# Patient Record
(demographics unavailable — no encounter records)

---

## 2025-05-08 NOTE — DISCUSSION/SUMMARY
[FreeTextEntry1] : Drinks up to 90 oz of water per day.  Will cut back and limit to 72 oz. Sucking candy for nausea only when it occurs. Look out front window of car and can use dramamine if needed.  Call if these maneuvers do not help and will discuss referral for further eval.  There is a family hx of migraine. Reviewed past labs and all nl.